# Patient Record
Sex: FEMALE
[De-identification: names, ages, dates, MRNs, and addresses within clinical notes are randomized per-mention and may not be internally consistent; named-entity substitution may affect disease eponyms.]

---

## 2019-11-28 ENCOUNTER — HOSPITAL ENCOUNTER (EMERGENCY)
Dept: HOSPITAL 56 - MW.ED | Age: 19
Discharge: HOME | End: 2019-11-28
Payer: COMMERCIAL

## 2019-11-28 DIAGNOSIS — Z79.899: ICD-10-CM

## 2019-11-28 DIAGNOSIS — S09.90XA: Primary | ICD-10-CM

## 2019-11-28 DIAGNOSIS — F32.9: ICD-10-CM
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
## 2019-11-28 PROCEDURE — 99284 EMERGENCY DEPT VISIT MOD MDM: CPT

## 2019-11-28 PROCEDURE — 72125 CT NECK SPINE W/O DYE: CPT

## 2019-11-28 PROCEDURE — 96372 THER/PROPH/DIAG INJ SC/IM: CPT

## 2019-11-28 PROCEDURE — 70450 CT HEAD/BRAIN W/O DYE: CPT

## 2019-11-28 NOTE — EDM.PDOC
ED HPI GENERAL MEDICAL PROBLEM





- General


Chief Complaint: Head Injury


Stated Complaint: HIT HEAD


Time Seen by Provider: 11/28/19 16:22


Source of Information: Reports: Patient


History Limitations: Reports: No Limitations





- History of Present Illness


INITIAL COMMENTS - FREE TEXT/NARRATIVE: 


HISTORY AND PHYSICAL:





History of present illness:


Patient is a 19-year-old female who presents to the emergency room with 

complaints of headache, pain behind the right ear into her neck post fall. She 

states last evening she had slipped and fallen from a standing height hitting 

the right side of her posterior scalp. She has had a dull headache since and 

pain with palpation behind the ear. She states she did not lose consciousness 

but then was told by her coworker that she "thought she saw me pass out". She 

is very tearful during the interview and states she is concerned she could have 

a fracture. Patient denies any fever, chills, ear/nasal drainage, change in 

vision, syncope or near syncope. Denies any chest pain, back pain, shortness of 

breath or cough. Denies any GI or  symptoms. Patient has been eating and 

drinking appropriately.





Review of systems: 


As per history of present illness and below otherwise all systems reviewed and 

negative.





Past medical history: 


As per history of present illness and as reviewed below otherwise 

noncontributory.





Surgical history: 


As per history of present illness and as reviewed below otherwise 

noncontributory.





Social history: 


See social history for further information





Family history: 


As per history of present illness and as reviewed below otherwise 

noncontributory.





Physical exam:


General: Well-developed and well-nourished 19-year-old female. Alert and 

oriented. Nontoxic appearing and in no acute distress. Vital signs are stable 

and have been reviewed by me.


HEENT: Pain with palpation behind the right ear into the upper cervical spine/

sternocleidomastoid, normocephalic, pupils equal and reactive bilaterally, 

negative for conjunctival pallor or scleral icterus, mucous membranes moist, 

TMs normal bilaterally, throat clear, neck supple, nontender, trachea midline. 

No drooling or trismus noted. No meningeal signs. No hot potato voice noted. 


Lungs: Clear to auscultation, breath sounds equal bilaterally, chest nontender.


Heart: S1S2, regular rate and rhythm without overt murmur


Abdomen: Soft, nondistended, nontender. Negative for masses or 

hepatosplenomegaly. Negative for costovertebral tenderness.


Pelvis: Stable nontender.


Skin: Intact, warm, dry. No lesions or rashes noted.


Extremities: Moves all extremities per self without difficulty or deficits, 

negative for cords or calf pain. Neurovascular unremarkable.


Neuro: Awake, alert, oriented. Cranial nerves II through XII unremarkable. 

Cerebellum unremarkable. Motor and sensory unremarkable throughout. Exam 

nonfocal.





Notes:


X-ray shows no acute fracture or findings. Left mild axillary sinusitis. All 

findings were shared with the patient. Patient declines wanting any antibiotics 

for the sinusitis. Toradol IM given. Supportive care measures were reviewed and 

discussed. Voices understanding and is agreeable to plan of care. Denies any 

further questions or concerns at this time.





Diagnostics:


Head, C-spine CT





Therapeutics:


Toradol





Prescription:


None





Impression: 


Head Injury





Plan:


1. Please review and follow the head injury instructions that we discussed in 

her printed in your discharge packet.


2. Limit any physical activities and follow cognitive rest (decrease screen time

, reading, tv, etc..) over the next 24 hours pending resolution of symptoms. 


3. Tylenol and/or ibuprofen as needed for pain management.


4. Follow-up with your primary care provider as we discussed. Return to the ED 

as needed and as discussed.





Definitive disposition and diagnosis as appropriate pending reevaluation and 

review of above.





  ** right back of head


Pain Score (Numeric/FACES): 8





- Related Data


 Allergies











Allergy/AdvReac Type Severity Reaction Status Date / Time


 


No Known Allergies Allergy   Verified 11/28/19 16:40











Home Meds: 


 Home Meds





Venlafaxine HCl [Venlafaxine HCl ER] 75 mg PO DAILY 11/28/19 [History]











Past Medical History


Musculoskeletal History: Reports: Fracture, Other (See Below)


Other Musculoskeletal History: right arm fracture


Neurological History: Reports: Other (See Below)


Psychiatric History: Reports: Depression





- Infectious Disease History


Infectious Disease History: Reports: Influenza





- Past Surgical History


HEENT Surgical History: Reports: Myringotomy w Tube(s)





Social & Family History





- Family History


Family Medical History: Noncontributory





- Caffeine Use


Caffeine Use: Reports: Coffee





ED ROS GENERAL





- Review of Systems


Review Of Systems: Comprehensive ROS is negative, except as noted in HPI.





ED EXAM, HEAD INJURY





- Physical Exam


Exam: See Below (See dictation)





Course





- Vital Signs


Last Recorded V/S: 


 Last Vital Signs











Temp  96.0 F   11/28/19 16:38


 


Pulse  122 H  11/28/19 16:38


 


Resp  18   11/28/19 16:38


 


BP  115/79   11/28/19 16:38


 


Pulse Ox  99   11/28/19 16:38














- Orders/Labs/Meds


Meds: 


Medications














Discontinued Medications














Generic Name Dose Route Start Last Admin





  Trade Name Freq  PRN Reason Stop Dose Admin


 


Ketorolac Tromethamine  60 mg  11/28/19 17:22  





  Toradol  IM  11/28/19 17:23  





  ONETIME ONE   





     





     





     





     














Departure





- Departure


Time of Disposition: 17:30


Disposition: Home, Self-Care 01


Clinical Impression: 


Head injury


Qualifiers:


 Encounter type: initial encounter Qualified Code(s): S09.90XA - Unspecified 

injury of head, initial encounter








- Discharge Information


Instructions:  Concussion, Adult, Easy-to-Read, Head Injury, Adult, Easy-to-Read


Referrals: 


PCP,Unknown [Primary Care Provider] - 


Forms:  ED Department Discharge


Additional Instructions: 


The following information is given to patients seen in the emergency department 

who are being discharged to home. This information is to outline your options 

for follow-up care. We provide all patients seen in our emergency department 

with a follow-up referral.





The need for follow-up, as well as the timing and circumstances, are variable 

depending upon the specifics of your emergency department visit.





If you don't have a primary care physician on staff, we will provide you with a 

referral. We always advise you to contact your personal physician following an 

emergency department visit to inform them of the circumstance of the visit and 

for follow-up with them and/or the need for any referrals to a consulting 

specialist.





The emergency department will also refer you to a specialist when appropriate. 

This referral assures that you have the opportunity for follow-up care with a 

specialist. All of these measure are taken in an effort to provide you with 

optimal care, which includes your follow-up.





Under all circumstances we always encourage you to contact your private 

physician who remains a resource for coordinating your care. When calling for 

follow-up care, please make the office aware that this follow-up is from your 

recent emergency room visit. If for any reason you are refused follow-up, 

please contact the [facility] Emergency 

Department at [phone] and asked to speak to the emergency department 

charge nurse.





PRAKASH [facility]


Primary Care


[address] [place] [address]


Phone: [phone]


Fax: [phone]





[facility]


[address] [place] [address]


Phone: [phone]


Fax: [phone]





1. Please review and follow the head injury instructions that we discussed in 

her printed in your discharge packet.


2. Limit any physical activities and follow cognitive rest (decrease screen time

, reading, tv, etc..) over the next 24 hours pending resolution of symptoms. 


3. Tylenol and/or ibuprofen as needed for pain management.


4. Follow-up with your primary care provider as we discussed. Return to the ED 

as needed and as discussed.

## 2019-11-28 NOTE — CT
INDICATION:



Fell. Hit head.



TECHNIQUE:



CT head without IV contrast.



FINDINGS:



No intracranial hemorrhage, edema, or mass effect. Small amounts of mucous 

and mucosal thickening in the left maxillary sinus. Remainder negative.



IMPRESSION:



No acute intracranial disease. Mild inflammatory changes left maxillary 

sinus.



Please note that all CT scans at this facility use dose modulation, 

iterative reconstruction, and/or weight-based dosing when appropriate to 

reduce radiation dose to as low as reasonably achievable.



Dictated by Dallas Nunez MD @ Nov 28 2019  5:04PM



Signed by Dr. Dallas Nunez @ Nov 28 2019  5:06PM

## 2019-11-28 NOTE — CT
INDICATION:



Fell. Neck pain.



TECHNIQUE:



CT cervical spine without IV contrast including axial, coronal and sagittal 

images.



FINDINGS:



No acute fracture or subluxation in cervical spine. Small amount of mucous 

and mucosal thickening in the left maxillary sinus consistent sinusitis. 

Remainder negative.



IMPRESSION:



1. No acute fracture, subluxation, or significant pathology in cervical 

spine.



2. Mild left-sided maxillary sinusitis.



Please note that all CT scans at this facility use dose modulation, 

iterative reconstruction, and/or weight-based dosing when appropriate to 

reduce radiation dose to as low as reasonably achievable.



Dictated by Dallas Nunez MD @ Nov 28 2019  5:04PM



Signed by Dr. Dallas Nunez @ Nov 28 2019  5:09PM

## 2020-04-15 ENCOUNTER — HOSPITAL ENCOUNTER (EMERGENCY)
Dept: HOSPITAL 56 - MW.ED | Age: 20
LOS: 1 days | Discharge: HOME | End: 2020-04-16
Payer: COMMERCIAL

## 2020-04-15 DIAGNOSIS — S13.4XXA: Primary | ICD-10-CM
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
## 2020-04-15 DIAGNOSIS — R55: ICD-10-CM

## 2020-04-15 DIAGNOSIS — Z79.899: ICD-10-CM

## 2020-04-15 DIAGNOSIS — F17.210: ICD-10-CM

## 2020-04-15 DIAGNOSIS — F32.9: ICD-10-CM
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
## 2020-04-15 LAB
BUN SERPL-MCNC: 19 MG/DL (ref 7–18)
CHLORIDE SERPL-SCNC: 100 MMOL/L (ref 98–107)
CO2 SERPL-SCNC: 23.1 MMOL/L (ref 21–32)
GLUCOSE SERPL-MCNC: 83 MG/DL (ref 74–106)
POTASSIUM SERPL-SCNC: 3.3 MMOL/L (ref 3.5–5.1)
SODIUM SERPL-SCNC: 134 MMOL/L (ref 136–145)

## 2020-04-15 PROCEDURE — 80305 DRUG TEST PRSMV DIR OPT OBS: CPT

## 2020-04-15 PROCEDURE — 36415 COLL VENOUS BLD VENIPUNCTURE: CPT

## 2020-04-15 PROCEDURE — 93005 ELECTROCARDIOGRAM TRACING: CPT

## 2020-04-15 PROCEDURE — 72125 CT NECK SPINE W/O DYE: CPT

## 2020-04-15 PROCEDURE — 85025 COMPLETE CBC W/AUTO DIFF WBC: CPT

## 2020-04-15 PROCEDURE — 99284 EMERGENCY DEPT VISIT MOD MDM: CPT

## 2020-04-15 PROCEDURE — 72128 CT CHEST SPINE W/O DYE: CPT

## 2020-04-15 PROCEDURE — 70450 CT HEAD/BRAIN W/O DYE: CPT

## 2020-04-15 PROCEDURE — 80053 COMPREHEN METABOLIC PANEL: CPT

## 2020-04-15 PROCEDURE — 81025 URINE PREGNANCY TEST: CPT

## 2020-04-15 PROCEDURE — 83735 ASSAY OF MAGNESIUM: CPT
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
